# Patient Record
Sex: FEMALE | NOT HISPANIC OR LATINO | Employment: UNEMPLOYED | URBAN - METROPOLITAN AREA
[De-identification: names, ages, dates, MRNs, and addresses within clinical notes are randomized per-mention and may not be internally consistent; named-entity substitution may affect disease eponyms.]

---

## 2018-11-28 ENCOUNTER — LAB REQUISITION (OUTPATIENT)
Dept: LAB | Facility: HOSPITAL | Age: 58
End: 2018-11-28

## 2018-11-28 ENCOUNTER — PREP FOR SURGERY (OUTPATIENT)
Dept: OTHER | Facility: HOSPITAL | Age: 58
End: 2018-11-28

## 2018-11-28 DIAGNOSIS — Z00.00 ENCOUNTER FOR GENERAL ADULT MEDICAL EXAMINATION WITHOUT ABNORMAL FINDINGS: ICD-10-CM

## 2018-11-28 DIAGNOSIS — S82.132A CLOSED FRACTURE OF MEDIAL PORTION OF LEFT TIBIAL PLATEAU, INITIAL ENCOUNTER: Primary | ICD-10-CM

## 2018-11-28 LAB
ALBUMIN SERPL-MCNC: 3.7 G/DL (ref 3.5–5.2)
ALBUMIN/GLOB SERPL: 1 G/DL
ALP SERPL-CCNC: 50 U/L (ref 39–117)
ALT SERPL W P-5'-P-CCNC: 13 U/L (ref 1–33)
ANION GAP SERPL CALCULATED.3IONS-SCNC: 14.3 MMOL/L
APTT PPP: 25.4 SECONDS (ref 22.7–35.4)
AST SERPL-CCNC: 18 U/L (ref 1–32)
BILIRUB SERPL-MCNC: 0.2 MG/DL (ref 0.1–1.2)
BUN BLD-MCNC: 10 MG/DL (ref 6–20)
BUN/CREAT SERPL: 13.9 (ref 7–25)
CALCIUM SPEC-SCNC: 9.1 MG/DL (ref 8.6–10.5)
CHLORIDE SERPL-SCNC: 99 MMOL/L (ref 98–107)
CO2 SERPL-SCNC: 25.7 MMOL/L (ref 22–29)
CREAT BLD-MCNC: 0.72 MG/DL (ref 0.57–1)
GFR SERPL CREATININE-BSD FRML MDRD: 83 ML/MIN/1.73
GLOBULIN UR ELPH-MCNC: 3.6 GM/DL
GLUCOSE BLD-MCNC: 140 MG/DL (ref 65–99)
INR PPP: 0.96 (ref 0.9–1.1)
POTASSIUM BLD-SCNC: 4 MMOL/L (ref 3.5–5.2)
PROT SERPL-MCNC: 7.3 G/DL (ref 6–8.5)
PROTHROMBIN TIME: 12.6 SECONDS (ref 11.7–14.2)
SODIUM BLD-SCNC: 139 MMOL/L (ref 136–145)

## 2018-11-28 PROCEDURE — 80053 COMPREHEN METABOLIC PANEL: CPT | Performed by: ORTHOPAEDIC SURGERY

## 2018-11-28 PROCEDURE — 36415 COLL VENOUS BLD VENIPUNCTURE: CPT | Performed by: ORTHOPAEDIC SURGERY

## 2018-11-28 PROCEDURE — 85610 PROTHROMBIN TIME: CPT | Performed by: ORTHOPAEDIC SURGERY

## 2018-11-28 PROCEDURE — 85730 THROMBOPLASTIN TIME PARTIAL: CPT | Performed by: ORTHOPAEDIC SURGERY

## 2018-11-28 RX ORDER — CEFAZOLIN SODIUM 2 G/100ML
2 INJECTION, SOLUTION INTRAVENOUS ONCE
Status: CANCELLED | OUTPATIENT
Start: 2018-11-28 | End: 2018-11-28

## 2018-11-28 NOTE — H&P
KESHAV PORTER is a pleasant 58-year-old female who presents to the office today for evaluation and management of LEFT knee injury.  She had an unfortunate fall from the ladder on 2018 while she was putting up [employer] lights.  She presented to [facility] emergency room, was evaluated with x-rays and CT scans.  She noticed immediate onset of sudden severe pain in the LEFT leg and knee radiating down to her ankle.  She was unable to ambulate.  She is here in the office in a wheelchair.  She is currently nonweightbearing.  She is accompanied by her daughter could office visit.  She has a history of fibromyalgia and [place] disease.  She is on chronic pain medication from [facility] pain management.   She denies any history of MRSA, DVT.  She has a history of lap band.  Review of Systems:    Allergies:  * zithromax (critical)  Medications:  * flexeril   fentanyl citrate solution (fentanyl citrate soln)   eq hydrocortisone plus cream (hydrocortisone crea)   adderall tablet (amphetamine-dextroamphetamine tabs)   percocet tablet (oxycodone-acetaminophen tabs)   Patient History of:  CHEST PAIN  NESTOR DISEASE, SPINDIOLYOIS  DEPRESSION  FIBROMYALGIA  OSTEOARTHRITIS  STOMACH ULCERS  COPD  HIGH CHOLESTEROL  BLOOD CLOTS/EMBOLISM - NEGATIVE  Surgical History:  LAP-BAND-   Hysterectomy-Total-[CPT-63183]   Gallbladder/Cholecystectomy-[CPT-04973]   (D&C) Dilation and Curettage-[CPT-02558]    Section-[CPT-71615]   Known Family History of:  hypertension-parents  kidney disease-sibling  heart disease-father  stroke-mother  cancer-sibling  cancer-father  cancer-mother  diabetes-mother  Social History:  CHARLOTTE NAZARIO is a  58 year old female.  She currently uses tobacco products.      Past medical, social, family histories and ROS reviewed today with the patient and changes documented in the chart (2018).    Physical Exam  Height:  65 in.    Weight:  136 lbs.     BMI:  22.71  Pulse:  94  Blood  pressure:  134 / 78 mm Hg    Ambulation: in a wheelchair      Mental/HEENT/Cardio/Skin  The patient's general appearance is well developed, well nourished, no acute distress.  Orientation is alert and oriented x 3.  The patient's mood is normal.  A head exam reveals normocephalic/atraumatic.  An eye exam reveals pupils equal.  Pulmonary exam shows normal air exchange, no labored breathing, or shortness of breath.  A skin exam shows normal temperature and color in the area of examination.      Right Knee      Left Knee  Skin is normal.  Effusion is 1+.  Patient shows no signs of DVT.  There is severe tenderness in the proximal tibial plateau.        Imaging/Diagnostic Studies    X-rays of the left knee were taken on 11/21/2018 [facility] .  X-rays show Medial tibial plateau fracture with displacement  CT scan was also reviewed.  Confirms the findings.  Impression  Left tibial plateau displaced fracture (CEO67-M38.142A)    Plan  The patient has  a displaced medial tibial plateau fracture .    Options were discussed including nonoperative management versus surgical intervention.    The patient is indicated for open reduction and internal fixation of LEFT tibial plateau fracture.    The likely risks and benefits of the procedure including but not limited to infection, DVT, pulmonary embolism, malunion, nonunion, post traumatic stiffness, post traumatic arthritis, possibility of injury to nerves, vessels, tendons have been discussed in detail with the patient and /.  Despite the risks involved the patient would like to proceed.    The surgery is scheduled for   at  [facility] on Dec 1, 2018.  Eliquis 2.5 mg 1 p.o. q.12, #40, no refills.    We discussed the benefits of surgical intervention, as well as alternative treatments.  Potential surgical risks and complications include but are not limited to expected outcomes and the risk that the procedure may not accomplish the desired objective.  Sufficient  opportunity was given to discuss the condition and treatment plan with the doctor, and all questions were answered for the patient.  The discussion lasted 30 minutes.      Zoey should follow up with ROMA NÚÑEZ MD post op.  Before being seen, ZOEY will have an X-ray in our office of the Left Knee [standing AP;Lateral;[employer]].      Patient evaluated and treated by ROMA NÚÑEZ MD in the office today.

## 2018-11-29 ENCOUNTER — TELEPHONE (OUTPATIENT)
Dept: LAB | Facility: HOSPITAL | Age: 58
End: 2018-11-29

## 2018-11-29 NOTE — TELEPHONE ENCOUNTER
A cbc was collected on 11- but was clotted and unable to render a result. Contacted the patient at her home number and requested a recollect be obtained at her earliest opportunity. The patient stated that she had a broken leg and no transportation. She stated she could not make it back in before her scheduled procedure. I called the office of Dr. Oliveira and informed Marcia that the CBC would not be completed before her procedure.

## 2018-11-30 PROBLEM — S82.132A CLOSED FRACTURE OF MEDIAL PLATEAU OF LEFT TIBIA: Status: ACTIVE | Noted: 2018-11-30

## 2018-12-06 ENCOUNTER — HOSPITAL ENCOUNTER (INPATIENT)
Facility: HOSPITAL | Age: 58
LOS: 1 days | Discharge: HOME HEALTH | End: 2018-12-07
Attending: ORTHOPAEDIC SURGERY | Admitting: ORTHOPAEDIC SURGERY

## 2018-12-06 ENCOUNTER — APPOINTMENT (OUTPATIENT)
Dept: GENERAL RADIOLOGY | Facility: HOSPITAL | Age: 58
End: 2018-12-06
Attending: ORTHOPAEDIC SURGERY

## 2018-12-06 ENCOUNTER — ANESTHESIA (OUTPATIENT)
Dept: PERIOP | Facility: HOSPITAL | Age: 58
End: 2018-12-06

## 2018-12-06 ENCOUNTER — APPOINTMENT (OUTPATIENT)
Dept: GENERAL RADIOLOGY | Facility: HOSPITAL | Age: 58
End: 2018-12-06

## 2018-12-06 ENCOUNTER — ANESTHESIA EVENT (OUTPATIENT)
Dept: PERIOP | Facility: HOSPITAL | Age: 58
End: 2018-12-06

## 2018-12-06 DIAGNOSIS — R26.89 DECREASED MOBILITY: Primary | ICD-10-CM

## 2018-12-06 DIAGNOSIS — S82.132A CLOSED FRACTURE OF MEDIAL PORTION OF LEFT TIBIAL PLATEAU, INITIAL ENCOUNTER: ICD-10-CM

## 2018-12-06 LAB
BASOPHILS # BLD AUTO: 0.05 10*3/MM3 (ref 0–0.2)
BASOPHILS NFR BLD AUTO: 0.6 % (ref 0–1.5)
DEPRECATED RDW RBC AUTO: 47.3 FL (ref 37–54)
EOSINOPHIL # BLD AUTO: 0.26 10*3/MM3 (ref 0–0.7)
EOSINOPHIL NFR BLD AUTO: 3.3 % (ref 0.3–6.2)
ERYTHROCYTE [DISTWIDTH] IN BLOOD BY AUTOMATED COUNT: 14.2 % (ref 11.7–13)
HCT VFR BLD AUTO: 41.7 % (ref 35.6–45.5)
HGB BLD-MCNC: 13.4 G/DL (ref 11.9–15.5)
IMM GRANULOCYTES # BLD: 0 10*3/MM3 (ref 0–0.03)
IMM GRANULOCYTES NFR BLD: 0 % (ref 0–0.5)
LYMPHOCYTES # BLD AUTO: 3.28 10*3/MM3 (ref 0.9–4.8)
LYMPHOCYTES NFR BLD AUTO: 41 % (ref 19.6–45.3)
MCH RBC QN AUTO: 29.4 PG (ref 26.9–32)
MCHC RBC AUTO-ENTMCNC: 32.1 G/DL (ref 32.4–36.3)
MCV RBC AUTO: 91.4 FL (ref 80.5–98.2)
MONOCYTES # BLD AUTO: 0.47 10*3/MM3 (ref 0.2–1.2)
MONOCYTES NFR BLD AUTO: 5.9 % (ref 5–12)
NEUTROPHILS # BLD AUTO: 3.94 10*3/MM3 (ref 1.9–8.1)
NEUTROPHILS NFR BLD AUTO: 49.2 % (ref 42.7–76)
PLATELET # BLD AUTO: 470 10*3/MM3 (ref 140–500)
PMV BLD AUTO: 10.4 FL (ref 6–12)
RBC # BLD AUTO: 4.56 10*6/MM3 (ref 3.9–5.2)
WBC NRBC COR # BLD: 8 10*3/MM3 (ref 4.5–10.7)

## 2018-12-06 PROCEDURE — 25010000002 ROPIVACAINE PER 1 MG: Performed by: ANESTHESIOLOGY

## 2018-12-06 PROCEDURE — C1713 ANCHOR/SCREW BN/BN,TIS/BN: HCPCS | Performed by: ORTHOPAEDIC SURGERY

## 2018-12-06 PROCEDURE — 25010000003 CEFAZOLIN IN DEXTROSE 2-4 GM/100ML-% SOLUTION: Performed by: ORTHOPAEDIC SURGERY

## 2018-12-06 PROCEDURE — 85025 COMPLETE CBC W/AUTO DIFF WBC: CPT | Performed by: ORTHOPAEDIC SURGERY

## 2018-12-06 PROCEDURE — 76000 FLUOROSCOPY <1 HR PHYS/QHP: CPT

## 2018-12-06 PROCEDURE — 25010000002 PROMETHAZINE PER 50 MG: Performed by: NURSE ANESTHETIST, CERTIFIED REGISTERED

## 2018-12-06 PROCEDURE — 25010000002 FENTANYL CITRATE (PF) 100 MCG/2ML SOLUTION: Performed by: ANESTHESIOLOGY

## 2018-12-06 PROCEDURE — 93010 ELECTROCARDIOGRAM REPORT: CPT | Performed by: INTERNAL MEDICINE

## 2018-12-06 PROCEDURE — 25010000002 DEXAMETHASONE PER 1 MG: Performed by: NURSE ANESTHETIST, CERTIFIED REGISTERED

## 2018-12-06 PROCEDURE — 25010000002 MIDAZOLAM PER 1 MG: Performed by: ANESTHESIOLOGY

## 2018-12-06 PROCEDURE — 93005 ELECTROCARDIOGRAM TRACING: CPT | Performed by: ORTHOPAEDIC SURGERY

## 2018-12-06 PROCEDURE — 73590 X-RAY EXAM OF LOWER LEG: CPT

## 2018-12-06 PROCEDURE — 94799 UNLISTED PULMONARY SVC/PX: CPT

## 2018-12-06 PROCEDURE — 25010000002 KETOROLAC TROMETHAMINE PER 15 MG: Performed by: ORTHOPAEDIC SURGERY

## 2018-12-06 PROCEDURE — 25010000002 HYDROMORPHONE PER 4 MG: Performed by: ORTHOPAEDIC SURGERY

## 2018-12-06 PROCEDURE — 25010000002 ONDANSETRON PER 1 MG: Performed by: NURSE ANESTHETIST, CERTIFIED REGISTERED

## 2018-12-06 PROCEDURE — 73560 X-RAY EXAM OF KNEE 1 OR 2: CPT

## 2018-12-06 PROCEDURE — 25010000002 PROPOFOL 10 MG/ML EMULSION: Performed by: NURSE ANESTHETIST, CERTIFIED REGISTERED

## 2018-12-06 PROCEDURE — 71045 X-RAY EXAM CHEST 1 VIEW: CPT

## 2018-12-06 PROCEDURE — 25010000002 FENTANYL CITRATE (PF) 100 MCG/2ML SOLUTION: Performed by: NURSE ANESTHETIST, CERTIFIED REGISTERED

## 2018-12-06 PROCEDURE — 0QSH04Z REPOSITION LEFT TIBIA WITH INTERNAL FIXATION DEVICE, OPEN APPROACH: ICD-10-PCS | Performed by: ORTHOPAEDIC SURGERY

## 2018-12-06 DEVICE — SCRW CORT S/TAP 3.5X45MM: Type: IMPLANTABLE DEVICE | Site: TIBIA | Status: FUNCTIONAL

## 2018-12-06 DEVICE — IMPLANTABLE DEVICE: Type: IMPLANTABLE DEVICE | Site: TIBIA | Status: FUNCTIONAL

## 2018-12-06 DEVICE — MINI SYRINGE INSERTER: Type: IMPLANTABLE DEVICE | Site: TIBIA | Status: FUNCTIONAL

## 2018-12-06 DEVICE — SCRW LK S/TAP STRDRV 3.5X50MM: Type: IMPLANTABLE DEVICE | Site: TIBIA | Status: FUNCTIONAL

## 2018-12-06 DEVICE — SCRW LK S/TAP STRDRV 3.5X60MM: Type: IMPLANTABLE DEVICE | Site: TIBIA | Status: FUNCTIONAL

## 2018-12-06 DEVICE — SCRW LK S/TAP STRDRV 3.5X40MM: Type: IMPLANTABLE DEVICE | Site: TIBIA | Status: FUNCTIONAL

## 2018-12-06 RX ORDER — ONDANSETRON 4 MG/1
4 TABLET, FILM COATED ORAL EVERY 6 HOURS PRN
Status: DISCONTINUED | OUTPATIENT
Start: 2018-12-06 | End: 2018-12-08 | Stop reason: HOSPADM

## 2018-12-06 RX ORDER — HYDROMORPHONE HYDROCHLORIDE 1 MG/ML
0.5 INJECTION, SOLUTION INTRAMUSCULAR; INTRAVENOUS; SUBCUTANEOUS
Status: DISCONTINUED | OUTPATIENT
Start: 2018-12-06 | End: 2018-12-08 | Stop reason: HOSPADM

## 2018-12-06 RX ORDER — DEXAMETHASONE SODIUM PHOSPHATE 10 MG/ML
INJECTION INTRAMUSCULAR; INTRAVENOUS AS NEEDED
Status: DISCONTINUED | OUTPATIENT
Start: 2018-12-06 | End: 2018-12-06 | Stop reason: SURG

## 2018-12-06 RX ORDER — ROCURONIUM BROMIDE 10 MG/ML
INJECTION, SOLUTION INTRAVENOUS AS NEEDED
Status: DISCONTINUED | OUTPATIENT
Start: 2018-12-06 | End: 2018-12-06 | Stop reason: SURG

## 2018-12-06 RX ORDER — HYDROMORPHONE HYDROCHLORIDE 1 MG/ML
0.5 INJECTION, SOLUTION INTRAMUSCULAR; INTRAVENOUS; SUBCUTANEOUS
Status: DISCONTINUED | OUTPATIENT
Start: 2018-12-06 | End: 2018-12-06 | Stop reason: HOSPADM

## 2018-12-06 RX ORDER — ONDANSETRON 4 MG/1
4 TABLET, ORALLY DISINTEGRATING ORAL EVERY 6 HOURS PRN
Status: DISCONTINUED | OUTPATIENT
Start: 2018-12-06 | End: 2018-12-08 | Stop reason: HOSPADM

## 2018-12-06 RX ORDER — SODIUM CHLORIDE 0.9 % (FLUSH) 0.9 %
1-10 SYRINGE (ML) INJECTION AS NEEDED
Status: DISCONTINUED | OUTPATIENT
Start: 2018-12-06 | End: 2018-12-06 | Stop reason: HOSPADM

## 2018-12-06 RX ORDER — MIDAZOLAM HYDROCHLORIDE 1 MG/ML
2 INJECTION INTRAMUSCULAR; INTRAVENOUS
Status: DISCONTINUED | OUTPATIENT
Start: 2018-12-06 | End: 2018-12-06 | Stop reason: HOSPADM

## 2018-12-06 RX ORDER — PROMETHAZINE HYDROCHLORIDE 25 MG/ML
12.5 INJECTION, SOLUTION INTRAMUSCULAR; INTRAVENOUS ONCE AS NEEDED
Status: DISCONTINUED | OUTPATIENT
Start: 2018-12-06 | End: 2018-12-06 | Stop reason: HOSPADM

## 2018-12-06 RX ORDER — IBUPROFEN 200 MG
200 TABLET ORAL EVERY 6 HOURS PRN
COMMUNITY

## 2018-12-06 RX ORDER — SCOLOPAMINE TRANSDERMAL SYSTEM 1 MG/1
1 PATCH, EXTENDED RELEASE TRANSDERMAL
Status: DISCONTINUED | OUTPATIENT
Start: 2018-12-06 | End: 2018-12-06 | Stop reason: HOSPADM

## 2018-12-06 RX ORDER — CEFAZOLIN SODIUM 2 G/100ML
2 INJECTION, SOLUTION INTRAVENOUS EVERY 8 HOURS
Status: COMPLETED | OUTPATIENT
Start: 2018-12-06 | End: 2018-12-07

## 2018-12-06 RX ORDER — SODIUM CHLORIDE, SODIUM LACTATE, POTASSIUM CHLORIDE, CALCIUM CHLORIDE 600; 310; 30; 20 MG/100ML; MG/100ML; MG/100ML; MG/100ML
100 INJECTION, SOLUTION INTRAVENOUS CONTINUOUS
Status: DISCONTINUED | OUTPATIENT
Start: 2018-12-06 | End: 2018-12-08 | Stop reason: HOSPADM

## 2018-12-06 RX ORDER — FENTANYL 12 UG/H
1 PATCH TRANSDERMAL
COMMUNITY

## 2018-12-06 RX ORDER — LIDOCAINE HYDROCHLORIDE 20 MG/ML
INJECTION, SOLUTION INFILTRATION; PERINEURAL AS NEEDED
Status: DISCONTINUED | OUTPATIENT
Start: 2018-12-06 | End: 2018-12-06 | Stop reason: SURG

## 2018-12-06 RX ORDER — NALOXONE HCL 0.4 MG/ML
0.2 VIAL (ML) INJECTION AS NEEDED
Status: DISCONTINUED | OUTPATIENT
Start: 2018-12-06 | End: 2018-12-06 | Stop reason: HOSPADM

## 2018-12-06 RX ORDER — ONDANSETRON 2 MG/ML
INJECTION INTRAMUSCULAR; INTRAVENOUS AS NEEDED
Status: DISCONTINUED | OUTPATIENT
Start: 2018-12-06 | End: 2018-12-06 | Stop reason: SURG

## 2018-12-06 RX ORDER — OXYCODONE AND ACETAMINOPHEN 7.5; 325 MG/1; MG/1
1 TABLET ORAL EVERY 4 HOURS PRN
Status: DISCONTINUED | OUTPATIENT
Start: 2018-12-06 | End: 2018-12-08 | Stop reason: HOSPADM

## 2018-12-06 RX ORDER — HYDROCODONE BITARTRATE AND ACETAMINOPHEN 5; 325 MG/1; MG/1
1 TABLET ORAL ONCE AS NEEDED
Status: DISCONTINUED | OUTPATIENT
Start: 2018-12-06 | End: 2018-12-06 | Stop reason: HOSPADM

## 2018-12-06 RX ORDER — FENTANYL CITRATE 50 UG/ML
INJECTION, SOLUTION INTRAMUSCULAR; INTRAVENOUS AS NEEDED
Status: DISCONTINUED | OUTPATIENT
Start: 2018-12-06 | End: 2018-12-06 | Stop reason: SURG

## 2018-12-06 RX ORDER — LIDOCAINE HYDROCHLORIDE 10 MG/ML
0.5 INJECTION, SOLUTION EPIDURAL; INFILTRATION; INTRACAUDAL; PERINEURAL ONCE AS NEEDED
Status: DISCONTINUED | OUTPATIENT
Start: 2018-12-06 | End: 2018-12-06 | Stop reason: HOSPADM

## 2018-12-06 RX ORDER — PROMETHAZINE HYDROCHLORIDE 25 MG/ML
INJECTION, SOLUTION INTRAMUSCULAR; INTRAVENOUS AS NEEDED
Status: DISCONTINUED | OUTPATIENT
Start: 2018-12-06 | End: 2018-12-06 | Stop reason: SURG

## 2018-12-06 RX ORDER — DIPHENHYDRAMINE HCL 25 MG
25 CAPSULE ORAL
Status: DISCONTINUED | OUTPATIENT
Start: 2018-12-06 | End: 2018-12-06 | Stop reason: HOSPADM

## 2018-12-06 RX ORDER — ONDANSETRON 2 MG/ML
4 INJECTION INTRAMUSCULAR; INTRAVENOUS EVERY 6 HOURS PRN
Status: DISCONTINUED | OUTPATIENT
Start: 2018-12-06 | End: 2018-12-08 | Stop reason: HOSPADM

## 2018-12-06 RX ORDER — DEXTROAMPHETAMINE SACCHARATE, AMPHETAMINE ASPARTATE MONOHYDRATE, DEXTROAMPHETAMINE SULFATE AND AMPHETAMINE SULFATE 2.5; 2.5; 2.5; 2.5 MG/1; MG/1; MG/1; MG/1
20 CAPSULE, EXTENDED RELEASE ORAL DAILY
Status: ACTIVE | OUTPATIENT
Start: 2018-12-06 | End: 2018-12-07

## 2018-12-06 RX ORDER — MIDAZOLAM HYDROCHLORIDE 1 MG/ML
1 INJECTION INTRAMUSCULAR; INTRAVENOUS
Status: DISCONTINUED | OUTPATIENT
Start: 2018-12-06 | End: 2018-12-06 | Stop reason: HOSPADM

## 2018-12-06 RX ORDER — OXYCODONE AND ACETAMINOPHEN 7.5; 325 MG/1; MG/1
2 TABLET ORAL EVERY 4 HOURS PRN
Status: DISCONTINUED | OUTPATIENT
Start: 2018-12-06 | End: 2018-12-08 | Stop reason: HOSPADM

## 2018-12-06 RX ORDER — IBUPROFEN 200 MG
200 TABLET ORAL EVERY 6 HOURS PRN
Status: DISCONTINUED | OUTPATIENT
Start: 2018-12-06 | End: 2018-12-08 | Stop reason: HOSPADM

## 2018-12-06 RX ORDER — OXYCODONE AND ACETAMINOPHEN 7.5; 325 MG/1; MG/1
1 TABLET ORAL EVERY 4 HOURS PRN
Status: ON HOLD | COMMUNITY
End: 2018-12-07 | Stop reason: SDUPTHER

## 2018-12-06 RX ORDER — SODIUM CHLORIDE, SODIUM LACTATE, POTASSIUM CHLORIDE, CALCIUM CHLORIDE 600; 310; 30; 20 MG/100ML; MG/100ML; MG/100ML; MG/100ML
9 INJECTION, SOLUTION INTRAVENOUS CONTINUOUS
Status: DISCONTINUED | OUTPATIENT
Start: 2018-12-06 | End: 2018-12-06 | Stop reason: HOSPADM

## 2018-12-06 RX ORDER — ONDANSETRON 2 MG/ML
4 INJECTION INTRAMUSCULAR; INTRAVENOUS ONCE AS NEEDED
Status: DISCONTINUED | OUTPATIENT
Start: 2018-12-06 | End: 2018-12-06 | Stop reason: HOSPADM

## 2018-12-06 RX ORDER — EPHEDRINE SULFATE 50 MG/ML
5 INJECTION, SOLUTION INTRAVENOUS ONCE AS NEEDED
Status: DISCONTINUED | OUTPATIENT
Start: 2018-12-06 | End: 2018-12-06 | Stop reason: HOSPADM

## 2018-12-06 RX ORDER — HYDROCORTISONE 10 MG/1
10 TABLET ORAL 2 TIMES DAILY
Status: DISCONTINUED | OUTPATIENT
Start: 2018-12-06 | End: 2018-12-08 | Stop reason: HOSPADM

## 2018-12-06 RX ORDER — CEFAZOLIN SODIUM 2 G/100ML
2 INJECTION, SOLUTION INTRAVENOUS ONCE
Status: COMPLETED | OUTPATIENT
Start: 2018-12-06 | End: 2018-12-06

## 2018-12-06 RX ORDER — KETOROLAC TROMETHAMINE 15 MG/ML
15 INJECTION, SOLUTION INTRAMUSCULAR; INTRAVENOUS EVERY 6 HOURS PRN
Status: COMPLETED | OUTPATIENT
Start: 2018-12-06 | End: 2018-12-07

## 2018-12-06 RX ORDER — LIDOCAINE HYDROCHLORIDE AND EPINEPHRINE BITARTRATE 20; .01 MG/ML; MG/ML
INJECTION, SOLUTION SUBCUTANEOUS
Status: COMPLETED | OUTPATIENT
Start: 2018-12-06 | End: 2018-12-06

## 2018-12-06 RX ORDER — ALBUTEROL SULFATE 2.5 MG/3ML
2.5 SOLUTION RESPIRATORY (INHALATION) ONCE AS NEEDED
Status: DISCONTINUED | OUTPATIENT
Start: 2018-12-06 | End: 2018-12-06 | Stop reason: HOSPADM

## 2018-12-06 RX ORDER — FAMOTIDINE 10 MG/ML
20 INJECTION, SOLUTION INTRAVENOUS ONCE
Status: COMPLETED | OUTPATIENT
Start: 2018-12-06 | End: 2018-12-06

## 2018-12-06 RX ORDER — PROMETHAZINE HYDROCHLORIDE 25 MG/1
25 SUPPOSITORY RECTAL ONCE AS NEEDED
Status: DISCONTINUED | OUTPATIENT
Start: 2018-12-06 | End: 2018-12-06 | Stop reason: HOSPADM

## 2018-12-06 RX ORDER — CYCLOBENZAPRINE HCL 5 MG
5 TABLET ORAL 2 TIMES DAILY PRN
COMMUNITY

## 2018-12-06 RX ORDER — HYDROCORTISONE 10 MG/1
10 TABLET ORAL 2 TIMES DAILY
COMMUNITY

## 2018-12-06 RX ORDER — FLUMAZENIL 0.1 MG/ML
0.2 INJECTION INTRAVENOUS AS NEEDED
Status: DISCONTINUED | OUTPATIENT
Start: 2018-12-06 | End: 2018-12-06 | Stop reason: HOSPADM

## 2018-12-06 RX ORDER — ROPIVACAINE HYDROCHLORIDE 5 MG/ML
INJECTION, SOLUTION EPIDURAL; INFILTRATION; PERINEURAL
Status: COMPLETED | OUTPATIENT
Start: 2018-12-06 | End: 2018-12-06

## 2018-12-06 RX ORDER — FENTANYL CITRATE 50 UG/ML
50 INJECTION, SOLUTION INTRAMUSCULAR; INTRAVENOUS
Status: DISCONTINUED | OUTPATIENT
Start: 2018-12-06 | End: 2018-12-06 | Stop reason: HOSPADM

## 2018-12-06 RX ORDER — CYCLOBENZAPRINE HCL 10 MG
5 TABLET ORAL 2 TIMES DAILY PRN
Status: DISCONTINUED | OUTPATIENT
Start: 2018-12-06 | End: 2018-12-08 | Stop reason: HOSPADM

## 2018-12-06 RX ORDER — PROPOFOL 10 MG/ML
VIAL (ML) INTRAVENOUS AS NEEDED
Status: DISCONTINUED | OUTPATIENT
Start: 2018-12-06 | End: 2018-12-06 | Stop reason: SURG

## 2018-12-06 RX ORDER — FENTANYL 12 UG/H
1 PATCH TRANSDERMAL
Status: DISCONTINUED | OUTPATIENT
Start: 2018-12-06 | End: 2018-12-08 | Stop reason: HOSPADM

## 2018-12-06 RX ORDER — NALOXONE HCL 0.4 MG/ML
0.1 VIAL (ML) INJECTION
Status: DISCONTINUED | OUTPATIENT
Start: 2018-12-06 | End: 2018-12-08 | Stop reason: HOSPADM

## 2018-12-06 RX ORDER — PROMETHAZINE HYDROCHLORIDE 25 MG/1
25 TABLET ORAL ONCE AS NEEDED
Status: DISCONTINUED | OUTPATIENT
Start: 2018-12-06 | End: 2018-12-06 | Stop reason: HOSPADM

## 2018-12-06 RX ADMIN — PROMETHAZINE HYDROCHLORIDE 6.25 MG: 25 INJECTION INTRAMUSCULAR; INTRAVENOUS at 16:01

## 2018-12-06 RX ADMIN — ROCURONIUM BROMIDE 10 MG: 10 INJECTION INTRAVENOUS at 14:55

## 2018-12-06 RX ADMIN — ROPIVACAINE HYDROCHLORIDE 30 ML: 5 INJECTION, SOLUTION EPIDURAL; INFILTRATION; PERINEURAL at 12:27

## 2018-12-06 RX ADMIN — FENTANYL CITRATE 50 MCG: 50 INJECTION, SOLUTION INTRAMUSCULAR; INTRAVENOUS at 12:14

## 2018-12-06 RX ADMIN — OXYCODONE HYDROCHLORIDE AND ACETAMINOPHEN 2 TABLET: 7.5; 325 TABLET ORAL at 20:56

## 2018-12-06 RX ADMIN — DEXAMETHASONE SODIUM PHOSPHATE 8 MG: 10 INJECTION INTRAMUSCULAR; INTRAVENOUS at 13:35

## 2018-12-06 RX ADMIN — MIDAZOLAM HYDROCHLORIDE 1 MG: 2 INJECTION, SOLUTION INTRAMUSCULAR; INTRAVENOUS at 12:14

## 2018-12-06 RX ADMIN — SODIUM CHLORIDE, POTASSIUM CHLORIDE, SODIUM LACTATE AND CALCIUM CHLORIDE 9 ML/HR: 600; 310; 30; 20 INJECTION, SOLUTION INTRAVENOUS at 12:16

## 2018-12-06 RX ADMIN — CEFAZOLIN SODIUM 2 G: 2 INJECTION, SOLUTION INTRAVENOUS at 13:21

## 2018-12-06 RX ADMIN — CEFAZOLIN SODIUM 2 G: 2 INJECTION, SOLUTION INTRAVENOUS at 20:57

## 2018-12-06 RX ADMIN — PROPOFOL 150 MG: 10 INJECTION, EMULSION INTRAVENOUS at 13:25

## 2018-12-06 RX ADMIN — ONDANSETRON 4 MG: 2 INJECTION INTRAMUSCULAR; INTRAVENOUS at 15:33

## 2018-12-06 RX ADMIN — MIDAZOLAM HYDROCHLORIDE 1 MG: 2 INJECTION, SOLUTION INTRAMUSCULAR; INTRAVENOUS at 12:20

## 2018-12-06 RX ADMIN — SUGAMMADEX 200 MG: 100 INJECTION, SOLUTION INTRAVENOUS at 15:39

## 2018-12-06 RX ADMIN — FENTANYL TRANSDERMAL 1 PATCH: 12.5 PATCH, EXTENDED RELEASE TRANSDERMAL at 20:56

## 2018-12-06 RX ADMIN — LIDOCAINE HYDROCHLORIDE 60 MG: 20 INJECTION, SOLUTION INFILTRATION; PERINEURAL at 13:25

## 2018-12-06 RX ADMIN — ROCURONIUM BROMIDE 50 MG: 10 INJECTION INTRAVENOUS at 13:25

## 2018-12-06 RX ADMIN — FAMOTIDINE 20 MG: 10 INJECTION, SOLUTION INTRAVENOUS at 12:14

## 2018-12-06 RX ADMIN — LIDOCAINE HYDROCHLORIDE AND EPINEPHRINE 30 ML: 20; 10 INJECTION, SOLUTION INFILTRATION; PERINEURAL at 12:27

## 2018-12-06 RX ADMIN — SCOPALAMINE 1 PATCH: 1 PATCH, EXTENDED RELEASE TRANSDERMAL at 12:32

## 2018-12-06 RX ADMIN — FENTANYL CITRATE 50 MCG: 50 INJECTION INTRAMUSCULAR; INTRAVENOUS at 14:26

## 2018-12-06 RX ADMIN — FENTANYL CITRATE 50 MCG: 50 INJECTION INTRAMUSCULAR; INTRAVENOUS at 13:25

## 2018-12-06 RX ADMIN — HYDROMORPHONE HYDROCHLORIDE 0.5 MG: 1 INJECTION, SOLUTION INTRAMUSCULAR; INTRAVENOUS; SUBCUTANEOUS at 23:37

## 2018-12-06 RX ADMIN — HYDROCORTISONE 10 MG: 10 TABLET ORAL at 20:56

## 2018-12-06 RX ADMIN — KETOROLAC TROMETHAMINE 15 MG: 15 INJECTION, SOLUTION INTRAMUSCULAR; INTRAVENOUS at 23:37

## 2018-12-06 NOTE — ANESTHESIA PROCEDURE NOTES
Peripheral Block      Patient reassessed immediately prior to procedure    Patient location during procedure: holding area  Start time: 12/6/2018 12:15 PM  Stop time: 12/6/2018 12:27 PM  Reason for block: procedure for pain, at surgeon's request and post-op pain management  Performed by  Anesthesiologist: David Vera MD  Preanesthetic Checklist  Completed: patient identified, site marked, surgical consent, pre-op evaluation, timeout performed, IV checked, risks and benefits discussed and monitors and equipment checked  Prep:  Pt Position: right lateral decubitus (and supine)  Sterile barriers:cap, gloves and sterile barriers  Prep: ChloraPrep  Patient monitoring: blood pressure monitoring, continuous pulse oximetry and EKG  Procedure  Sedation:yes    Guidance:nerve stimulator  Images:still images not obtained    Laterality:left  Block Type:femoral and sciatic  Injection Technique:single-shotResistance on Injection: none  Medications Used: ropivacaine (NAROPIN) 0.5 % injection, 30 mL  lidocaine-EPINEPHrine (XYLOCAINE W/EPI) 2 %-1:[id number] injection, 30 mL  Medications  Comment:Lidocaine was perservative free, 1/200,000epi 30cc    Post Assessment  Injection Assessment: negative aspiration for heme, no paresthesia on injection and incremental injection  Patient Tolerance:comfortable throughout block  Complications:no

## 2018-12-06 NOTE — ANESTHESIA PROCEDURE NOTES
ANESTHESIA INTUBATION  Urgency: elective    Airway not difficult    General Information and Staff    Patient location during procedure: OR  Anesthesiologist: Naveen Morgan MD  CRNA: Jacque Humphrey CRNA    Indications and Patient Condition  Indications for airway management: airway protection    Preoxygenated: yes  MILS not maintained throughout  Mask difficulty assessment: 1 - vent by mask    Final Airway Details  Final airway type: endotracheal airway      Successful airway: ETT  Cuffed: yes   Successful intubation technique: direct laryngoscopy  Facilitating devices/methods: intubating stylet  Endotracheal tube insertion site: oral  Blade: Jannet  Blade size: 3  ETT size (mm): 7.0  Cormack-Lehane Classification: grade I - full view of glottis  Placement verified by: chest auscultation   Cuff volume (mL): 8  Measured from: lips  ETT to lips (cm): 20  Number of attempts at approach: 1    Additional Comments  PreO2 100% face mask, IV induction, easy mask, DVL x1, cords noted, tube through, cuff up, EBBSH, +etCO2, = chest movement, tube secured in place, atraumatic, teeth and lips intact as preop.

## 2018-12-06 NOTE — PLAN OF CARE
Problem: Patient Care Overview  Goal: Plan of Care Review  Outcome: Ongoing (interventions implemented as appropriate)   12/06/18 1746   Coping/Psychosocial   Plan of Care Reviewed With patient   Plan of Care Review   Progress improving   OTHER   Outcome Summary A&OX4, VOIDING, DSG CDI, KI, IVF, IV ABX, ADEQUATE PO INTAKE, UNABLE TO DF/PF D/T BLOCK, PAIN CONTROLLED     Goal: Individualization and Mutuality  Outcome: Ongoing (interventions implemented as appropriate)    Goal: Discharge Needs Assessment  Outcome: Ongoing (interventions implemented as appropriate)    Goal: Interprofessional Rounds/Family Conf  Outcome: Ongoing (interventions implemented as appropriate)      Problem: Pain, Acute (Adult)  Goal: Identify Related Risk Factors and Signs and Symptoms  Outcome: Outcome(s) achieved Date Met: 12/06/18 12/06/18 1746   Pain, Acute (Adult)   Related Risk Factors (Acute Pain) surgery     Goal: Acceptable Pain Control/Comfort Level  Outcome: Ongoing (interventions implemented as appropriate)      Problem: Fall Risk (Adult)  Goal: Identify Related Risk Factors and Signs and Symptoms  Outcome: Outcome(s) achieved Date Met: 12/06/18 12/06/18 1746   Fall Risk (Adult)   Related Risk Factors (Fall Risk) history of falls     Goal: Absence of Fall  Outcome: Ongoing (interventions implemented as appropriate)

## 2018-12-06 NOTE — BRIEF OP NOTE
TIBIAL PLATEAU OPEN REDUCTION INTERNAL FIXATION  Progress Note    Zoey Rich  12/6/2018    Pre-op Diagnosis:   Closed fracture of medial portion of left tibial plateau, initial encounter [S82.132A]       Post-Op Diagnosis Codes:     * Closed fracture of medial portion of left tibial plateau, initial encounter [S82.132A]    Procedure/CPT® Codes:      Procedure(s):  TIBIAL PLATEAU OPEN REDUCTION INTERNAL FIXATION    Surgeon(s):  Gerson Oliveira MD    Anesthesia: General with Block    Staff:   Circulator: Kyaw Harris RN  Scrub Person: Obinna Preciado  Vendor Representative: Danie Stephens    Estimated Blood Loss: 30 mL    Urine Voided: 0 mL    Specimens:                None      Drains:      Findings: see dict     Complications: nil      Gerson Oliveira MD     Date: 12/6/2018  Time: 3:47 PM

## 2018-12-06 NOTE — ANESTHESIA POSTPROCEDURE EVALUATION
"Patient: Zoey Rich    Procedure Summary     Date:  12/06/18 Room / Location:  [facility] OR [address] [place] [address] MAIN OR    Anesthesia Start:  1321 Anesthesia Stop:  1606    Procedure:  TIBIAL PLATEAU OPEN REDUCTION INTERNAL FIXATION (Left Leg Lower) Diagnosis:       Closed fracture of medial portion of left tibial plateau, initial encounter      (Closed fracture of medial portion of left tibial plateau, initial encounter [S82.132A])    Surgeon:  Gerson Oliveira MD Provider:  Naveen Morgan MD    Anesthesia Type:  general with block ASA Status:  3          Anesthesia Type: general with block  Last vitals  BP   155/92 (12/06/18 1655)   Temp   36.4 °C (97.6 °F) (12/06/18 1559)   Pulse   92 (12/06/18 1655)   Resp   16 (12/06/18 1655)     SpO2   99 % (12/06/18 1655)     Post Anesthesia Care and Evaluation    Patient location during evaluation: PACU  Patient participation: complete - patient participated  Level of consciousness: awake and alert  Pain management: adequate  Airway patency: patent  Anesthetic complications: No anesthetic complications    Cardiovascular status: acceptable  Respiratory status: acceptable  Hydration status: acceptable    Comments: /92   Pulse 92   Temp 36.4 °C (97.6 °F) (Oral)   Resp 16   Ht 165.1 cm (65\")   Wt 59.1 kg (130 lb 3.2 oz)   SpO2 99%   BMI 21.67 kg/m²         "

## 2018-12-06 NOTE — ANESTHESIA PREPROCEDURE EVALUATION
Anesthesia Evaluation     Patient summary reviewed and Nursing notes reviewed   history of anesthetic complications: PONV  NPO Solid Status: > 8 hours  NPO Liquid Status: > 8 hours           Airway   Mallampati: II  Neck ROM: full  No difficulty expected  Dental - normal exam     Pulmonary     breath sounds clear to auscultation  (+) pneumonia , a smoker Current, COPD,   Cardiovascular     Rhythm: regular    (+) PVD, hyperlipidemia,       Neuro/Psych  (+) headaches,       ROS Comment: Legally blind left eye  GI/Hepatic/Renal/Endo      Musculoskeletal     Abdominal    Substance History      OB/GYN          Other   (+) arthritis                     Anesthesia Plan    ASA 3     general with block   (Primary service requests fem/Sci)  intravenous induction   Anesthetic plan, all risks, benefits, and alternatives have been provided, discussed and informed consent has been obtained with: patient.

## 2018-12-06 NOTE — INTERVAL H&P NOTE
H&P reviewed. The patient was examined and there are no changes to the H&P.     Albert García MD  Adult Reconstruction Fellow

## 2018-12-07 VITALS
OXYGEN SATURATION: 100 % | HEART RATE: 91 BPM | WEIGHT: 130.2 LBS | RESPIRATION RATE: 16 BRPM | HEIGHT: 65 IN | DIASTOLIC BLOOD PRESSURE: 75 MMHG | BODY MASS INDEX: 21.69 KG/M2 | SYSTOLIC BLOOD PRESSURE: 145 MMHG | TEMPERATURE: 98 F

## 2018-12-07 PROCEDURE — 25010000003 CEFAZOLIN IN DEXTROSE 2-4 GM/100ML-% SOLUTION: Performed by: ORTHOPAEDIC SURGERY

## 2018-12-07 PROCEDURE — 25010000002 KETOROLAC TROMETHAMINE PER 15 MG: Performed by: ORTHOPAEDIC SURGERY

## 2018-12-07 PROCEDURE — 97161 PT EVAL LOW COMPLEX 20 MIN: CPT

## 2018-12-07 PROCEDURE — 97110 THERAPEUTIC EXERCISES: CPT

## 2018-12-07 PROCEDURE — 25010000002 HYDROMORPHONE PER 4 MG: Performed by: ORTHOPAEDIC SURGERY

## 2018-12-07 RX ORDER — OXYCODONE AND ACETAMINOPHEN 7.5; 325 MG/1; MG/1
1 TABLET ORAL EVERY 4 HOURS PRN
Qty: 30 TABLET | Refills: 0 | Status: SHIPPED | OUTPATIENT
Start: 2018-12-07

## 2018-12-07 RX ADMIN — HYDROMORPHONE HYDROCHLORIDE 0.5 MG: 1 INJECTION, SOLUTION INTRAMUSCULAR; INTRAVENOUS; SUBCUTANEOUS at 11:26

## 2018-12-07 RX ADMIN — OXYCODONE HYDROCHLORIDE AND ACETAMINOPHEN 2 TABLET: 7.5; 325 TABLET ORAL at 05:06

## 2018-12-07 RX ADMIN — CYCLOBENZAPRINE 5 MG: 10 TABLET, FILM COATED ORAL at 13:32

## 2018-12-07 RX ADMIN — OXYCODONE HYDROCHLORIDE AND ACETAMINOPHEN 2 TABLET: 7.5; 325 TABLET ORAL at 13:08

## 2018-12-07 RX ADMIN — OXYCODONE HYDROCHLORIDE AND ACETAMINOPHEN 2 TABLET: 7.5; 325 TABLET ORAL at 16:46

## 2018-12-07 RX ADMIN — HYDROMORPHONE HYDROCHLORIDE 0.5 MG: 1 INJECTION, SOLUTION INTRAMUSCULAR; INTRAVENOUS; SUBCUTANEOUS at 15:41

## 2018-12-07 RX ADMIN — HYDROCORTISONE 10 MG: 10 TABLET ORAL at 09:10

## 2018-12-07 RX ADMIN — OXYCODONE HYDROCHLORIDE AND ACETAMINOPHEN 2 TABLET: 7.5; 325 TABLET ORAL at 09:10

## 2018-12-07 RX ADMIN — HYDROMORPHONE HYDROCHLORIDE 0.5 MG: 1 INJECTION, SOLUTION INTRAMUSCULAR; INTRAVENOUS; SUBCUTANEOUS at 07:40

## 2018-12-07 RX ADMIN — OXYCODONE HYDROCHLORIDE AND ACETAMINOPHEN 2 TABLET: 7.5; 325 TABLET ORAL at 01:02

## 2018-12-07 RX ADMIN — CEFAZOLIN SODIUM 2 G: 2 INJECTION, SOLUTION INTRAVENOUS at 05:06

## 2018-12-07 RX ADMIN — KETOROLAC TROMETHAMINE 15 MG: 15 INJECTION, SOLUTION INTRAMUSCULAR; INTRAVENOUS at 05:06

## 2018-12-07 RX ADMIN — HYDROMORPHONE HYDROCHLORIDE 0.5 MG: 1 INJECTION, SOLUTION INTRAMUSCULAR; INTRAVENOUS; SUBCUTANEOUS at 18:27

## 2018-12-07 RX ADMIN — HYDROMORPHONE HYDROCHLORIDE 0.5 MG: 1 INJECTION, SOLUTION INTRAMUSCULAR; INTRAVENOUS; SUBCUTANEOUS at 03:04

## 2018-12-07 RX ADMIN — APIXABAN 2.5 MG: 2.5 TABLET, FILM COATED ORAL at 09:10

## 2018-12-07 RX ADMIN — CYCLOBENZAPRINE 5 MG: 10 TABLET, FILM COATED ORAL at 03:07

## 2018-12-07 NOTE — PLAN OF CARE
Problem: Patient Care Overview  Goal: Plan of Care Review  Outcome: Outcome(s) achieved Date Met: 12/07/18 12/07/18 0543 12/07/18 0465   Coping/Psychosocial   Plan of Care Reviewed With --  patient;daughter   Plan of Care Review   Progress improving --    OTHER   Outcome Summary --  POD#1 Of L ORIF tibial plateau fx. A&Ox4. C/o pain. tolerating oral pain medication and IV diluadid for break through pain. Up with assistance. Dressing to left left c.d.i with some drainage to dressing. Good sensation and motion to ble. Some swelling and bruising to left leg. elevated on pillows. Educated on use of IS and to elevated and use of ice to help with swelling. Patient being discharged to home today with family care. Discharge instructions given and verbalized understanding. Belongings with patient. Rx script given. Vitals victor m stable.     Goal: Individualization and Mutuality  Outcome: Outcome(s) achieved Date Met: 12/07/18    Goal: Discharge Needs Assessment  Outcome: Outcome(s) achieved Date Met: 12/07/18    Goal: Interprofessional Rounds/Family Conf  Outcome: Outcome(s) achieved Date Met: 12/07/18      Problem: Pain, Acute (Adult)  Goal: Acceptable Pain Control/Comfort Level  Outcome: Outcome(s) achieved Date Met: 12/07/18      Problem: Fall Risk (Adult)  Goal: Absence of Fall  Outcome: Outcome(s) achieved Date Met: 12/07/18

## 2018-12-07 NOTE — PLAN OF CARE
Problem: Patient Care Overview  Goal: Plan of Care Review   12/07/18 1001   OTHER   Outcome Summary Pt POD1 L ORIF tibial plateau. Fracture resulted from fall off ladder. Previously independent. Has been NWB since fall. Has been using crutches, w/c, and bedside commode. L knee immobilizer to be on when up. Upon eval, pt requires up to [place] when walking with crutches. SBA-CGA with Rwx. PT recommends Rwx for home to improve safety and decrease fall risk. Pt to use crutches on stairs only when assist (from daughter) is present. Educated pt and daughter on how to safely complete stairs. Ordered Rwx for home. Anticipate d/c home with assist and [facility] PT when medically able. Will progress towards goals as tolerated.

## 2018-12-07 NOTE — DISCHARGE PLACEMENT REQUEST
"Zoey Rich (58 y.o. Female)     Date of Birth Social Security Number Address Home Phone MRN    1960  [address] [facility] [address] [phone] [id number]    Rastafari Marital Status          Unknown        Admission Date Admission Type Admitting Provider Attending Provider Department, Room/Bed    12/6/18 Elective Gerson Oliveira MD Yakkanti, Madhusudhan R, MD [address], P886/1    Discharge Date Discharge Disposition Discharge Destination                       Attending Provider:  Gerson Oliveira MD    Allergies:  Zithromax [Azithromycin]    Isolation:  None   Infection:  None   Code Status:  CPR    Ht:  165.1 cm (65\")   Wt:  59.1 kg (130 lb 3.2 oz)    Admission Cmt:  None   Principal Problem:  Closed fracture of medial plateau of left tibia [S82.132A] More...                 Active Insurance as of 12/6/2018     Primary Coverage     Payor Plan Insurance Group Employer/Plan Group    HUMANA MEDICARE REPLACEMENT HUMANA MEDICARE REPL [id number]     Payor Plan Address Payor Plan Phone Number Payor Plan Fax Number Effective Dates    [address] [address] [phone]  7/1/2018 - None Entered    [facility] [id number]       Subscriber Name Subscriber Birth Date Member ID       ZOEY RICH 1960 [id number]                 Emergency Contacts      (Rel.) Home Phone Work Phone Mobile Phone    Heath Turcios (Daughter) [phone] -- --              "

## 2018-12-07 NOTE — THERAPY EVALUATION
Acute Care - Physical Therapy Initial Evaluation  [facility]     Patient Name: Zoey Rich  : 1960  MRN: [id number]  Today's Date: 2018         Referring Physician: kathy       Admit Date: 2018    Visit Dx:     ICD-10-CM ICD-9-CM   1. Decreased mobility R26.89 781.99   2. Closed fracture of medial portion of left tibial plateau, initial encounter S82.132A 823.00     Patient Active Problem List   Diagnosis   • Closed fracture of medial plateau of left tibia     Past Medical History:   Diagnosis Date   • [place] disease (CMS/[facility])    • Chest pain    • COPD (chronic obstructive pulmonary disease) (CMS/[facility])    • Depression    • Fibromyalgia    • Gastric ulcer    • High cholesterol    • Legally blind in left eye, as defined in [place]    • Osteoarthritis    • Pneumonia    • PONV (postoperative nausea and vomiting)    • Retinal micro-aneurysm of right eye    • Spinal headache    • Spondylolysis      Past Surgical History:   Procedure Laterality Date   •  SECTION     • CHOLECYSTECTOMY     • DILATION AND CURETTAGE, DIAGNOSTIC / THERAPEUTIC     • HYSTERECTOMY     • LAPAROSCOPIC GASTRIC BANDING          PT ASSESSMENT (last 12 hours)      Physical Therapy Evaluation     Row Name 18 0953          PT Evaluation Time/Intention    Subjective Information  complains of;pain  -MA     Document Type  evaluation;therapy note (daily note)  -MA     Mode of Treatment  physical therapy;individual therapy  -MA     Patient Effort  good  -MA     Symptoms Noted During/After Treatment  increased pain  -MA     Row Name 18 0953          General Information    Patient Profile Reviewed?  yes  -MA     Referring Physician  kathy   -MA     Patient Observations  alert;cooperative;agree to therapy  -MA     Patient/Family Observations  supine in bed, no acute distress, daughter assists with putting L knee immoblizer on  -MA     Prior Level of Function  independent:  -MA     Equipment Currently Used at Home  none   -MA     Pertinent History of Current Functional Problem  Fell at home resulting in R tibial plateau fracture. POD1 L ORIF tibial plateau. NWB, L knee immobilizer when up. Has crutches, w/c and bedside commode   -MA     Existing Precautions/Restrictions  fall;brace worn when out of bed NWB  -MA     Barriers to Rehab  none identified  -MA     Row Name 12/07/18 0953          Relationship/Environment    Lives With  alone children to help   -MA     Row Name 12/07/18 0953          Home Main Entrance    Number of Stairs, Main Entrance  three  -MA     Surface of Stairs, Main Entrance  concrete  -MA     Stair Railings, Main Entrance  -- railing not safe   -MA     Row Name 12/07/18 0953          Stairs Within Home, Primary    Number of Stairs, Within Home, Primary  two  -MA     Stair Railings, Within Home, Primary  none  -MA     Row Name 12/07/18 0953          Cognitive Assessment/Interventions    Additional Documentation  Cognitive Assessment/Intervention (Group)  -MA     Row Name 12/07/18 0953          Cognitive Assessment/Intervention- PT/OT    Affect/Mental Status (Cognitive)  WNL  -MA     Orientation Status (Cognition)  oriented x 4  -MA     Follows Commands (Cognition)  WNL  -MA     Personal Safety Interventions  fall prevention program maintained;gait belt;muscle strengthening facilitated;nonskid shoes/slippers when out of bed;supervised activity  -MA     Row Name 12/07/18 0953          Safety Issues, Functional Mobility    Impairments Affecting Function (Mobility)  balance;endurance/activity tolerance;pain;range of motion (ROM);strength  -MA     Row Name 12/07/18 0953          Bed Mobility Assessment/Treatment    Bed Mobility Assessment/Treatment  supine-sit  -MA     Supine-Sit [place] (Bed Mobility)  supervision  -MA     Bed Mobility, Safety Issues  decreased use of legs for bridging/pushing  -MA     Assistive Device (Bed Mobility)  bed rails;head of bed elevated  -MA     Row Name 12/07/18 0953           Transfer Assessment/Treatment    Transfer Assessment/Treatment  sit-stand transfer;stand-sit transfer  -MA     Sit-Stand Parker (Transfers)  contact guard  -MA     Stand-Sit Parker (Transfers)  contact guard  -MA     Row Name 12/07/18 0953          Sit-Stand Transfer    Assistive Device (Sit-Stand Transfers)  walker, front-wheeled  -MA     Row Name 12/07/18 0953          Stand-Sit Transfer    Assistive Device (Stand-Sit Transfers)  walker, front-wheeled  -MA     Row Name 12/07/18 0953          Gait/Stairs Assessment/Training    [place] Level (Gait)  contact guard  -MA     Assistive Device (Gait)  walker, front-wheeled  -MA     Distance in Feet (Gait)  20  -MA     Pattern (Gait)  step-to  -MA     Deviations/Abnormal Patterns (Gait)  dang decreased;stride length decreased  -MA     [place] Level (Stairs)  minimum assist (75% patient effort)  -MA     Assistive Device (Stairs)  crutches, axillary  -MA     Number of Steps (Stairs)  4  -MA     Ascending Technique (Stairs)  step-to-step  -MA     Descending Technique (Stairs)  step-to-step  -MA     Maintains Weight-bearing Status (Stairs)  able to maintain;verbal cues to maintain  -MA     Stairs, Safety Issues  balance decreased during turns  -MA     Comment (Gait/Stairs)  cues for sequencing  -MA     Row Name 12/07/18 0953          General ROM    GENERAL ROM COMMENTS  R LE WFL   -MA     Row Name 12/07/18 0953          MMT (Manual Muscle Testing)    General MMT Comments  R LE WFL   -MA     Row Name 12/07/18 0953          Motor Assessment/Intervention    Additional Documentation  Balance (Group)  -MA     Row Name 12/07/18 0953          Balance    Balance  static standing balance  -MA     Row Name 12/07/18 0953          Static Standing Balance    Level of Parker (Supported Standing, Static Balance)  contact guard assist  -MA     Time Able to Maintain Position (Supported Standing, Static Balance)  30 to 45 seconds  -MA     Assistive Device  Utilized (Supported Standing, Static Balance)  rolling walker  -MA     Row Name 12/07/18 0953          Pain Assessment    Additional Documentation  Pain Scale: Numbers Pre/Post-Treatment (Group)  -MA     Row Name 12/07/18 0953          Pain Scale: Numbers Pre/Post-Treatment    Pain Scale: Numbers, Pretreatment  4/10  -MA     Pain Scale: Numbers, Post-Treatment  8/10  -MA     Pain Location - Side  Left  -MA     Pain Location - Orientation  lower  -MA     Pain Location  extremity  -MA     Pain Intervention(s)  Repositioned;Ambulation/increased activity  -MA     Row Name             Wound 12/06/18 1536 Left leg incision    Wound - Properties Group Date first assessed: 12/06/18  -JF Time first assessed: 1536  -JF Side: Left  -JF Location: leg  -JF Type: incision  -JF    Row Name 12/07/18 0953          Physical Therapy Clinical Impression    Patient/Family Goals Statement (PT Clinical Impression)  return home   -MA     Criteria for Skilled Interventions Met (PT Clinical Impression)  yes;treatment indicated  -MA     Pathology/Pathophysiology Noted (Describe Specifically for Each System)  musculoskeletal  -MA     Impairments Found (describe specific impairments)  aerobic capacity/endurance;gait, locomotion, and balance;muscle performance;ROM  -MA     Rehab Potential (PT Clinical Summary)  good, to achieve stated therapy goals  -MA     Patient/Family Concerns, Equipment Needs at Discharge (PT)  discussed different shower chair options   -MA     Row Name 12/07/18 0953          Vital Signs    O2 Delivery Pre Treatment  room air  -MA     Row Name 12/07/18 0953          Physical Therapy Goals    Bed Mobility Goal Selection (PT)  bed mobility, PT goal 1  -MA     Transfer Goal Selection (PT)  transfer, PT goal 1  -MA     Gait Training Goal Selection (PT)  gait training, PT goal 1  -MA     Stairs Goal Selection (PT)  stairs, PT goal 1  -MA     Additional Documentation  Stairs Goal Selection (PT) (Row)  -MA     Row Name 12/07/18  0953          Bed Mobility Goal 1 (PT)    Activity/Assistive Device (Bed Mobility Goal 1, PT)  bed mobility activities, all  -MA     [place] Level/Cues Needed (Bed Mobility Goal 1, PT)  independent  -MA     Time Frame (Bed Mobility Goal 1, PT)  1 week  -MA     Row Name 12/07/18 0953          Transfer Goal 1 (PT)    Activity/Assistive Device (Transfer Goal 1, PT)  sit-to-stand/stand-to-sit;bed-to-chair/chair-to-bed;walker, rolling  -MA     [place] Level/Cues Needed (Transfer Goal 1, PT)  conditional independence  -MA     Time Frame (Transfer Goal 1, PT)  1 week  -MA     Row Name 12/07/18 0953          Gait Training Goal 1 (PT)    Activity/Assistive Device (Gait Training Goal 1, PT)  gait (walking locomotion);walker, rolling  -MA     [place] Level (Gait Training Goal 1, PT)  conditional independence  -MA     Distance (Gait Goal 1, PT)  25  -MA     Time Frame (Gait Training Goal 1, PT)  1 week  -MA     Row Name 12/07/18 0953          Stairs Goal 1 (PT)    Activity/Assistive Device (Stairs Goal 1, PT)  stairs, all skills;crutches, axillary  -MA     [place] Level/Cues Needed (Stairs Goal 1, PT)  supervision required  -MA     Number of Stairs (Stairs Goal 1, PT)  3  -MA     Time Frame (Stairs Goal 1, PT)  1 week  -MA     Row Name 12/07/18 0953          Positioning and Restraints    Pre-Treatment Position  in bed  -MA     Post Treatment Position  chair  -MA     In Chair  reclined;call light within reach;encouraged to call for assist;with family/caregiver no alarm on when entering room   -MA     Row Name 12/07/18 0953          Living Environment    Home Accessibility  stairs to enter home;stairs within home  -MA       User Key  (r) = Recorded By, (t) = Taken By, (c) = Cosigned By    Initials Name Provider Type    Brianne Law, PT Physical Therapist    Kyaw Brown, RN Registered Nurse        Physical Therapy Education     Title: PT OT SLP Therapies (In Progress)     Topic: Physical Therapy (In  Progress)     Point: Mobility training (Done)     Learning Progress Summary           Patient Acceptance, E, VU,NR by MA at 12/7/2018 10:00 AM                   Point: Body mechanics (Done)     Learning Progress Summary           Patient Acceptance, E, VU,NR by MA at 12/7/2018 10:00 AM                   Point: Precautions (Done)     Learning Progress Summary           Patient Acceptance, E, VU,NR by MA at 12/7/2018 10:00 AM                               User Key     Initials Effective Dates Name Provider Type Discipline    MA 10/19/18 -  Brianne Cruz, PT Physical Therapist PT              PT Recommendation and Plan  Anticipated Discharge Disposition (PT): home with home health, home with assist  Planned Therapy Interventions (PT Eval): balance training, bed mobility training, gait training, home exercise program, patient/family education, stair training, strengthening, transfer training  Therapy Frequency (PT Clinical Impression): daily  Outcome Summary/Treatment Plan (PT)  Anticipated Equipment Needs at Discharge (PT): front wheeled walker  Patient/Family Concerns, Equipment Needs at Discharge (PT): discussed different shower chair options   Anticipated Discharge Disposition (PT): home with home health, home with assist  Outcome Summary: Pt POD1 L ORIF tibial plateau. Fracture resulted from fall off ladder. Previously independent. Has been NWB since fall. Has been using crutches, w/c, and bedside commode. L knee immobilizer to be on when up. Upon eval, pt requires up to [place] when walking with crutches. SBA-CGA with Rwx. PT recommends Rwx for home to improve safety and decrease fall risk. Pt to use crutches on stairs only when assist (from daughter) is present. Educated pt and daughter on how to safely complete stairs. Ordered Rwx for home. Anticipate d/c home with assist and [facility] when medically able. Will progress towards goals as tolerated.   Outcome Measures     Row Name 12/07/18 1000             How much help  from another person do you currently need...    Turning from your back to your side while in flat bed without using bedrails?  3  -MA      Moving from lying on back to sitting on the side of a flat bed without bedrails?  3  -MA      Moving to and from a bed to a chair (including a wheelchair)?  3  -MA      Standing up from a chair using your arms (e.g., wheelchair, bedside chair)?  3  -MA      Climbing 3-5 steps with a railing?  3  -MA      To walk in hospital room?  3  -MA      AM-PAC 6 Clicks Score  18  -MA         Functional Assessment    Outcome Measure Options  AM-PAC 6 Clicks Basic Mobility (PT)  -MA        User Key  (r) = Recorded By, (t) = Taken By, (c) = Cosigned By    Initials Name Provider Type    Brianne Law PT Physical Therapist         Time Calculation:   PT Charges     Row Name 12/07/18 1003             Time Calculation    Start Time  0916  -MA      Stop Time  0950  -MA      Time Calculation (min)  34 min  -MA      PT Received On  12/07/18  -MA      PT - Next Appointment  12/08/18  -MA      PT Goal Re-Cert Due Date  12/14/18  -MA         Time Calculation- PT    Total Timed Code Minutes- PT  25 minute(s)  -MA        User Key  (r) = Recorded By, (t) = Taken By, (c) = Cosigned By    Initials Name Provider Type    Brianne Law PT Physical Therapist        Therapy Suggested Charges     Code   Minutes Charges    None           Therapy Charges for Today     Code Description Service Date Service Provider Modifiers Qty    08133943110 HC PT EVAL LOW COMPLEXITY 2 12/7/2018 Brianne Cruz, PT GP 1    29222199904 HC PT THER PROC EA 15 MIN 12/7/2018 Brianne Cruz, PT GP 2          PT G-Codes  Outcome Measure Options: AM-PAC 6 Clicks Basic Mobility (PT)  AM-PAC 6 Clicks Score: 18      Brianne Cruz PT  12/7/2018

## 2018-12-07 NOTE — PROGRESS NOTES
Patient: Zoey Rich  YOB: 1960     Date of Admission: 12/6/2018  9:55 AM Medical Record Number: [id number]     Attending Physician: Gerson Oliveira,Keiry    Status Post:  Procedure(s):  TIBIAL PLATEAU OPEN REDUCTION INTERNAL FIXATIONPost Operative Day Number: 1    Subjective : No new orthopaedic complaints     Pain Relief: some relief with present medication.     Systemic Complaints: No Complaints  Vitals:    12/07/18 0318 12/07/18 0725 12/07/18 1120 12/07/18 1506   BP: 126/66 111/58 133/74 145/75   BP Location: Left arm Left arm Left arm Right arm   Patient Position: Lying Lying Lying Lying   Pulse: 89 83 88 91   Resp: 16 16 16 16   Temp: 97.7 °F (36.5 °C) 98.1 °F (36.7 °C) 99 °F (37.2 °C) 98 °F (36.7 °C)   TempSrc: Oral Oral Oral Oral   SpO2: 100% 99% 100% 100%   Weight:       Height:           Physical Exam: 58 y.o. female    General Appearance:       Alert, cooperative, in no acute distress                  Extremities:    Dressing Clean, Dry and Intact         Incision healthy without signs or symptoms of infections         No clinical sign of DVT        Able to do good movements of digits    Pulses:   Pulses palpable and equal bilaterally           Diagnostic Tests:     Results from last 7 days   Lab Units  12/06/18   1045   WBC 10*3/mm3  8.00   HEMOGLOBIN g/dL  13.4   HEMATOCRIT %  41.7   PLATELETS 10*3/mm3  470             No results found for: CRP  No results found for: SEDRATE  No results found for: URICACID  No results found for: CRYSTAL  Microbiology Results (last 10 days)     ** No results found for the last 240 hours. **        Xr Knee 1 Or 2 View Left    Result Date: 12/6/2018  Status post ORIF of intra-articular left proximal tibia fracture. The major fracture fragments appear to be in anatomic position.  This report was finalized on 12/6/2018 7:40 PM by Dr. Eduardo Izquierdo M.D.      Xr Tibia Fibula 2 View Left    Result Date: 12/6/2018   As described.  This report was  finalized on 12/6/2018 3:48 PM by Dr. Solo Bermeo M.D.      Fl C Arm During Surgery    Result Date: 12/6/2018   As described.  This report was finalized on 12/6/2018 3:48 PM by Dr. Solo Bermeo M.D.              Current Medications:  Scheduled Meds:  fentaNYL 1 patch Transdermal Q72H   hydrocortisone 10 mg Oral BID     Continuous Infusions:  lactated ringers 100 mL/hr     PRN Meds:.•  cyclobenzaprine  •  HYDROmorphone **AND** naloxone  •  ibuprofen  •  ondansetron **OR** ondansetron ODT **OR** ondansetron  •  oxyCODONE-acetaminophen  •  oxyCODONE-acetaminophen    Assessment: Status post  Procedure(s):  TIBIAL PLATEAU OPEN REDUCTION INTERNAL FIXATION    Patient Active Problem List   Diagnosis   • Closed fracture of medial plateau of left tibia       PLAN:   Continues current post-op course  Mobilize with PT as tolerated per protocol    Weight Bearing: NWB  Discharge Plan: OK to plan for discharge in  today to home and home health  from orthopadic perspective.    Gerson Oliveira MD    Date: 12/7/2018    Time: 4:46 PM

## 2018-12-07 NOTE — PLAN OF CARE
Problem: Patient Care Overview  Goal: Plan of Care Review  Outcome: Ongoing (interventions implemented as appropriate)   12/07/18 0543   Coping/Psychosocial   Plan of Care Reviewed With patient   Plan of Care Review   Progress improving   OTHER   Outcome Summary VSS, pain always 8/10, up with minimal assist to BSC, RA, KI, home possible today     Goal: Individualization and Mutuality  Outcome: Ongoing (interventions implemented as appropriate)    Goal: Discharge Needs Assessment  Outcome: Ongoing (interventions implemented as appropriate)      Problem: Pain, Acute (Adult)  Goal: Acceptable Pain Control/Comfort Level  Outcome: Ongoing (interventions implemented as appropriate)      Problem: Fall Risk (Adult)  Goal: Absence of Fall  Outcome: Ongoing (interventions implemented as appropriate)

## 2019-01-21 ENCOUNTER — HOSPITAL ENCOUNTER (OUTPATIENT)
Dept: OTHER | Facility: HOSPITAL | Age: 59
Discharge: HOME | End: 2019-01-21
Attending: NEUROLOGICAL SURGERY

## 2020-02-05 ENCOUNTER — HOSPITAL ENCOUNTER (OUTPATIENT)
Dept: OTHER | Facility: HOSPITAL | Age: 60
Discharge: HOME | End: 2020-02-05

## 2021-11-04 ENCOUNTER — OFFICE (OUTPATIENT)
Dept: URBAN - METROPOLITAN AREA CLINIC 5 | Facility: CLINIC | Age: 61
End: 2021-11-04

## 2021-11-04 VITALS — TEMPERATURE: 98.4 F | WEIGHT: 149 LBS | HEART RATE: 90 BPM | HEIGHT: 65 IN | OXYGEN SATURATION: 96 %

## 2021-11-04 DIAGNOSIS — Z86.010 PERSONAL HISTORY OF COLONIC POLYPS: ICD-10-CM

## 2021-11-04 DIAGNOSIS — R93.3 ABNORMAL FINDINGS ON DIAGNOSTIC IMAGING OF OTHER PARTS OF DI: ICD-10-CM

## 2021-11-04 DIAGNOSIS — Z98.84 BARIATRIC SURGERY STATUS: ICD-10-CM

## 2021-11-04 DIAGNOSIS — R12 HEARTBURN: ICD-10-CM

## 2021-11-04 DIAGNOSIS — F17.290 NICOTINE DEPENDENCE, OTHER TOBACCO PRODUCT, UNCOMPLICATED: ICD-10-CM

## 2021-11-04 DIAGNOSIS — K59.00 CONSTIPATION, UNSPECIFIED: ICD-10-CM

## 2021-11-04 DIAGNOSIS — Z80.0 FAMILY HISTORY OF MALIGNANT NEOPLASM OF DIGESTIVE ORGANS: ICD-10-CM

## 2021-11-04 PROCEDURE — 99205 OFFICE O/P NEW HI 60 MIN: CPT | Performed by: INTERNAL MEDICINE

## 2021-11-04 RX ORDER — LUBIPROSTONE 24 UG/1
48 CAPSULE, GELATIN COATED ORAL
Qty: 60 | Refills: 5 | Status: ACTIVE
Start: 2021-11-04

## 2022-09-28 NOTE — OP NOTE
ORTHOPAEDIC OPERATIVE NOTE    Patient: Zoey Rich    YOB: 1960    Medical Record Number: [id number]    Attending Physician: Gerson Oliveira,*    Primary Care Physician: Provider, No Known    Date of Service: 12/6/2018    Surgeon: Gerson Oliveira MD        DATE OF PROCEDURE: 12/6/2018    Pre-op Diagnosis:   Closed fracture of medial portion of left tibial plateau, initial encounter [S82.132A]    Post-Op Diagnosis Codes:     * Closed fracture of medial portion of left tibial plateau, initial encounter [S82.132A]    PROCEDURE PERFORMED:  Procedure(s):  TIBIAL PLATEAU OPEN REDUCTION INTERNAL FIXATION  utilizing  Synthes   anatomically contoured proximal tibia  locking plate  (Synthes).    SURGEON: Gerson Oliveira MD     ASSISTANT: Albert García MD, Fellow    The services of a skilled  first assist were necessary for performing the procedure safely and expeditiously.  The first assist was present for the entire duration of the case and helped with positioning, retraction and closure of the incision.      ANESTHESIA:   General with Block  Anesthesiologist: Naveen Morgan MD  CRNA: Jacque Humphrey CRNA; Vani Valdovinos CRNA; Nikky Valdez CRNA    Estimated Blood Loss: 30 mL    Specimens: * No orders in the log *    COMPLICATIONS:  Nil.     DRAINS: Nil       INDICATIONS: The patient is a 58 y.o. female  who  Presented to my office for evaluation and management of her left knee injury .  The patient sustained a comminuted proximal tibial plateau fracture Schatzker 4  with displacement. Patient initially presented to  and was evaluated with a CT scan   knee.  Treatment options and alternatives were discussed in detail with the patient who is indicated for an open reduction and internal fixation . The likely risks and benefits of the procedure including but not limited to infection, DVT, pulmonary embolism, leg length discrepancy,  ----- Message from Nettie Holcomb sent at 9/28/2022  8:53 AM CDT -----  Contact: self/[phone]  Patient is calling to consult with nurse regarding scheduling an appt with Dr Hernandez, please call her back at [phone].     malunion nonunion, possibility of injury to nerves or vessels, stiffness, arthritis, compartment syndrome,  possibility of periprosthetic fractures have been discussed in detail. Despite the risks involved, the patient and  family would like to proceed. Despite the risks involved, the patient elected to proceed and informed consent was obtained and the patient was scheduled for surgery. The patient was seen in the preoperative holding area and the operative site was marked.    DESCRIPTION OF PROCEDURE:     The patient was transferred to [facility] operating room. Preoperative antibiotics in the form of  Kefzol  intravenously was infused prior to the incision  according to the SCIP protocol.  A surgical time out was done with the team and the correct patient, surgical side and site were identified.     A well padded tourniquet was placed proximally on the thigh. After achieving adequate general anesthesia the patient was placed in prone position and the  leg was prepped and draped in the usual sterile fashion.  A roll of sterile towels was utilized under the thigh.    Under image intensifier control the fracture site was identified   in both AP and lateral views.      The tourniquet was inflated to a pressure of 250 mm Hg. I planned a posteromedial approach. A skin incision was made on the postero medial aspect of the proximal third of the leg in a L - shaped manner, starting in the popliteal crease and along medial border of gastrocnemius.  skin and subcutaneous tissue were incised and the deep fascia was incised in line with the skin incision.  Care was taken to protect the subcutaneous nerves. The medial head of gastrocnemius and soleus and popliteus were elevated off the posterior aspect of the proximal tibia. The medial aspect of the proximal tibia  was identified.   Subperiosteal dissection was done along the shaft of the tibia , NV bundle was protected throughout.     The fracture site in the  posteromedial aspect was identified. This was a large posteromedial fragment. It was reduced by clearing the organized heamatoma and early callus. The knee was gently placed in valgus stress and extension to assist the reduction. The Piccador was used and the fragment was keyed in the cortical fracture site.  The overall alignment of the articular surface was very satisfactory under image intensifier views. The reduction was temporarily held with the help of 2 mm K wire.      I selected an anatomically contoured Posteromedial locking plate 2 holes (Synthes) in order to span the  Fracture and to be able to adequately fix the fracture. The plate was mounted to its locking guide and positioned along the posteromedial  aspect of the tibia and positioned satisfactorily both in AP and lateral views.   A guidewire was passed into the proxima tibia using the guide parallel to the joint line.  I used a 3.5 mm cortical screw to buttress the plate down to bone, this was later explanted.  The overall positioning of the plate and reduction of the fracture was found to be satisfactory.     Three proximal locking screws were fixed in the proximal  portion of the tibia under image intensifier control.   I finished the distal fixation of the shaft with three locking screws and removed the cortical screw as it was slightly longer.  Care was taken to avoid placement of any intra-articular  screws .  The overall reduction of the fracture, fixation of the fracture and position of the hardware was found to be satisfactory and stable.  The knee was ranged and range of motion was found to be satisfactory from 0-90°. The knee was stable for varus valgus stress.     The bone quality was good and there was no necessity for augmentation with bone void fillers.     The sponge and needle count was found to be correct. The incisions were thoroughly irrigated with saline and the incisions were closed in layers sterile dressings were placed and the  patient was transferred to the recovery room in a stable condition. The patient had adequate distal pulses and good capillary refill.  The compartments were soft. A knee immobilizer was given.  I plan to start  Eliquis daily starting on postoperative day #1 for DVT prophylaxis.  Also patient will receive 2 more doses of  Kefzol   for antibiotic prophylaxis .   We will assess mobility with physical therapy and make plans accordingly for discharge.    I discussed the satisfactory performance of the procedure with the patient's family and discussed with them The postoperative management.      Gerson Oliveira M.D.    12/6/2018    CC: Provider, No Known

## 2023-08-01 ENCOUNTER — HOSPITAL ENCOUNTER (OUTPATIENT)
Dept: MRI IMAGING | Facility: HOSPITAL | Age: 63
Discharge: HOME | End: 2023-08-01
Admitting: PHYSICIAN ASSISTANT
Payer: MEDICARE

## 2023-08-01 DIAGNOSIS — M54.16 LUMBAR RADICULOPATHY: ICD-10-CM

## 2023-08-01 PROCEDURE — 72148 MRI LUMBAR SPINE W/O DYE: CPT

## 2023-08-15 ENCOUNTER — TRANSCRIBE ORDERS (OUTPATIENT)
Dept: ADMINISTRATIVE | Facility: HOSPITAL | Age: 63
End: 2023-08-15
Payer: MEDICARE

## 2023-08-15 DIAGNOSIS — Z12.2 SCREENING FOR LUNG CANCER: Primary | ICD-10-CM

## 2023-08-15 DIAGNOSIS — F17.200 SMOKER: ICD-10-CM

## 2023-08-25 ENCOUNTER — HOSPITAL ENCOUNTER (OUTPATIENT)
Dept: CT IMAGING | Facility: HOSPITAL | Age: 63
Discharge: HOME | End: 2023-08-25
Admitting: INTERNAL MEDICINE
Payer: MEDICARE

## 2023-08-25 DIAGNOSIS — F17.200 SMOKER: ICD-10-CM

## 2023-08-25 DIAGNOSIS — Z12.2 SCREENING FOR LUNG CANCER: ICD-10-CM

## 2023-08-25 PROCEDURE — 71271 CT THORAX LUNG CANCER SCR C-: CPT

## 2024-08-28 ENCOUNTER — TRANSCRIBE ORDERS (OUTPATIENT)
Dept: ADMINISTRATIVE | Facility: HOSPITAL | Age: 64
End: 2024-08-28
Payer: MEDICARE

## 2024-08-28 DIAGNOSIS — M43.16 SPONDYLOLISTHESIS OF LUMBAR REGION: Primary | ICD-10-CM

## 2024-09-06 ENCOUNTER — TRANSCRIBE ORDERS (OUTPATIENT)
Dept: ADMINISTRATIVE | Facility: HOSPITAL | Age: 64
End: 2024-09-06
Payer: MEDICARE

## 2024-09-06 DIAGNOSIS — M54.16 LUMBAR RADICULOPATHY: Primary | ICD-10-CM

## 2024-09-27 ENCOUNTER — HOSPITAL ENCOUNTER (OUTPATIENT)
Dept: MRI IMAGING | Facility: HOSPITAL | Age: 64
Discharge: HOME | End: 2024-09-27
Payer: MEDICARE

## 2024-09-27 ENCOUNTER — HOSPITAL ENCOUNTER (OUTPATIENT)
Dept: GENERAL RADIOLOGY | Facility: HOSPITAL | Age: 64
Discharge: HOME | End: 2024-09-27
Payer: MEDICARE

## 2024-09-27 DIAGNOSIS — M43.16 SPONDYLOLISTHESIS OF LUMBAR REGION: ICD-10-CM

## 2024-09-27 DIAGNOSIS — M54.16 LUMBAR RADICULOPATHY: ICD-10-CM

## 2024-09-27 PROCEDURE — 72120 X-RAY BEND ONLY L-S SPINE: CPT

## 2024-10-11 ENCOUNTER — HOSPITAL ENCOUNTER (OUTPATIENT)
Dept: MRI IMAGING | Facility: HOSPITAL | Age: 64
Discharge: HOME | End: 2024-10-11
Payer: MEDICARE

## 2024-10-11 PROCEDURE — 72148 MRI LUMBAR SPINE W/O DYE: CPT

## (undated) DEVICE — SUT VIC 3/0 CTI 36IN J944H

## (undated) DEVICE — PAD,ABDOMINAL,8"X10",ST,LF: Brand: MEDLINE

## (undated) DEVICE — SKIN PREP TRAY W/CHG: Brand: MEDLINE INDUSTRIES, INC.

## (undated) DEVICE — SUT ETHIB 1 CT1 30IN  X425H

## (undated) DEVICE — OPTIFOAM GENTLE SA, POSTOP, 4X12: Brand: MEDLINE

## (undated) DEVICE — ENCORE® LATEX ORTHO SIZE 8, STERILE LATEX POWDER-FREE SURGICAL GLOVE: Brand: ENCORE

## (undated) DEVICE — POOLE SUCTION HANDLE: Brand: CARDINAL HEALTH

## (undated) DEVICE — APPL CHLORAPREP W/TINT 26ML ORNG

## (undated) DEVICE — Device

## (undated) DEVICE — BNDG ESMARK STRL 6INX12FT LF

## (undated) DEVICE — GLV SURG TRIUMPH CLASSIC PF LTX 8 STRL

## (undated) DEVICE — TOWEL,OR,DSP,ST,BLUE,STD,4/PK,20PK/CS: Brand: MEDLINE

## (undated) DEVICE — UNDERCAST PADDING: Brand: DEROYAL

## (undated) DEVICE — 3M™ IOBAN™ 2 ANTIMICROBIAL INCISE DRAPE 6650EZ: Brand: IOBAN™ 2

## (undated) DEVICE — DRP C/ARM 41X74IN

## (undated) DEVICE — 3M™ STERI-STRIP™ REINFORCED ADHESIVE SKIN CLOSURES, R1547, 1/2 IN X 4 IN (12 MM X 100 MM), 6 STRIPS/ENVELOPE: Brand: 3M™ STERI-STRIP™

## (undated) DEVICE — SUT VIC 0 CT1 36IN J946H

## (undated) DEVICE — INTENT TO BE USED WITH SUTURE MATERIAL FOR TISSUE CLOSURE: Brand: RICHARD-ALLAN® NEEDLE 1/2 CIRCLE TAPER

## (undated) DEVICE — T-DRAPE,EXTREMITY,STERILE: Brand: MEDLINE

## (undated) DEVICE — BNDG ELAS ELITE V/CLOSE 6IN 5YD LF STRL

## (undated) DEVICE — SPNG LAP 18X18IN LF STRL PK/5

## (undated) DEVICE — GOWN,PREVENTION PLUS,XLONG/XLARGE,STRL: Brand: MEDLINE

## (undated) DEVICE — DRSNG GZ CURAD XEROFORM NONADHS 5X9IN STRL

## (undated) DEVICE — GLV SURG BIOGEL LTX PF 8

## (undated) DEVICE — DRP C/ARMOR

## (undated) DEVICE — PROXIMATE RH ROTATING HEAD SKIN STAPLERS (35 WIDE) CONTAINS 35 STAINLESS STEEL STAPLES: Brand: PROXIMATE

## (undated) DEVICE — DRAPE,REIN 53X77,STERILE: Brand: MEDLINE

## (undated) DEVICE — SPNG GZ WOVN 4X4IN 12PLY 10/BX STRL

## (undated) DEVICE — DISPOSABLE TOURNIQUET CUFF SINGLE BLADDER, SINGLE PORT AND QUICK CONNECT CONNECTOR: Brand: COLOR CUFF

## (undated) DEVICE — BIT DRL QC DIA 2.5X110MM

## (undated) DEVICE — SOL ISO/ALC RUB 70PCT 4OZ

## (undated) DEVICE — DRSNG WND GEL FIBR OPTICELL AG PLS W/SLV LF 4X5IN  STRL

## (undated) DEVICE — PK ORTHO MAJ 40